# Patient Record
Sex: MALE | Race: WHITE
[De-identification: names, ages, dates, MRNs, and addresses within clinical notes are randomized per-mention and may not be internally consistent; named-entity substitution may affect disease eponyms.]

---

## 2019-03-21 ENCOUNTER — HOSPITAL ENCOUNTER (EMERGENCY)
Dept: HOSPITAL 10 - FTE | Age: 34
Discharge: HOME | End: 2019-03-21
Payer: COMMERCIAL

## 2019-03-21 VITALS — RESPIRATION RATE: 16 BRPM | DIASTOLIC BLOOD PRESSURE: 89 MMHG | SYSTOLIC BLOOD PRESSURE: 152 MMHG | HEART RATE: 110 BPM

## 2019-03-21 VITALS
HEIGHT: 67 IN | BODY MASS INDEX: 34.08 KG/M2 | HEIGHT: 67 IN | WEIGHT: 217.16 LBS | BODY MASS INDEX: 34.08 KG/M2 | WEIGHT: 217.16 LBS
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
## 2019-03-21 DIAGNOSIS — S62.307A: Primary | ICD-10-CM
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
## 2019-03-21 PROCEDURE — 29125 APPL SHORT ARM SPLINT STATIC: CPT

## 2019-03-21 PROCEDURE — 73130 X-RAY EXAM OF HAND: CPT

## 2019-03-21 NOTE — ERD
ER Documentation


Chief Complaint


Chief Complaint





Complains of left hand ppain after hitting a refrigerator





HPI


33-year-old male presenting with pain to left hand.  Patient states that he had 


a refrigerator and it caused him pain.  This happened yesterday.  He has not 


taken medications.  He is right-hand dominant.  Denies medical problems.  NKDA. 


Surgical history denies.  Social history denies





ROS


All systems reviewed and are negative except as per history of present illness.





Medications


Home Meds


Active Scripts


Naproxen* (Naprosyn*) 500 Mg Tablet, 500 MG PO BID PRN for PAIN AND/OR 


INFLAMMATION, #30 TAB


   Prov:LESA HAN PA-C         3/21/19


Hydrocodone/Acetaminophen (Norco 5-325 Tablet) 1 Each Tablet, 1 TAB PO Q6H PRN 


for PAIN, #7 TAB


   Prov:LESA HAN PA-C         3/21/19





Allergies


Allergies:  


Coded Allergies:  


     No Known Allergy (Unverified , 3/21/19)





PMhx/Soc


Medical and Surgical Hx:  pt denies Medical Hx, pt denies Surgical Hx


Hx Alcohol Use:  No


Hx Substance Use:  No


Hx Tobacco Use:  No


Smoking Status:  Never smoker





FmHx


Family History:  No diabetes, No coronary disease, No other





Physical Exam


Vitals





Vital Signs


  Date      Temp   Pulse  Resp  B/P (MAP)   Pulse Ox  O2         O2 Flow    FiO2


Time                                                  Delivery   Rate


   3/21/19  100.8    110    16      152/89        98  Room Air


     13:03                           (110)


   3/21/19   98.7    115    20      141/92        98


     09:50                           (108)





Physical Exam


GENERAL: The patient is well-appearing, well-nourished, in no acute distress


CHEST: Clear to auscultation bilaterally.  There are no rales, wheezes or 


rhonchi.


HEART: Regular rate and rhythm.  No murmurs, clicks, rubs or gallops.  


EXTREMITIES: Tender to palpation to the base of the right fifth digit.  Mild 


deformity with swelling.  Patient is able to isolate the DIP and PIP joint as 


well as the MCP joint of the fifth digit.  No neurovascular deficit.


NEUROLOGIC: Alert and oriented.  Cranial nerves II through XII intact.  Motor 


strength in all 4 extremities with 5 out of 5 strength.  Sensation grossly 


intact.  


SKIN: There is no apparent rash or petechiae.  The skin is warm and dry.


Results 24 hrs





Current Medications


 Medications
   Dose
          Sig/Akshat
       Start Time
   Status  Last


 (Trade)       Ordered        Route
 PRN     Stop Time              Admin
Dose


                              Reason                                Admin


                1 tab          ONCE  ONCE
    3/21/19       DC           3/21/19


Acetaminophen                 PO
            12:30
                       12:19



/
                                           3/21/19 12:31


Hydrocodone


Bitart



(Norco


(5/325))


 Ondansetron    4 mg           ONCE  STAT
    3/21/19       DC           3/21/19


HCl
  (Zofran                 ODT
           12:10
                       12:19



Odt)                                         3/21/19 12:11








Procedures/MDM





                            DIAGNOSTIC IMAGING REPORT





Patient: MORAIMA KRISHNAN   : 1985   Age: 33  Sex: M                  


     


       MR #:    [id number]   Acct #:   [id number]    DOS: 19 1119


Ordering MD: BK HAN PA-C   Location:  [facility]   Room/Bed:            


                               


                                        


PROCEDURE:   Left hand x-ray 


 


CLINICAL INDICATION:   pain 


 


TECHNIQUE:   AP, lateral and oblique views of the left hand were obtained. 


 


COMPARISON:   None 


 


FINDINGS:


There is normal mineralization.  


There is a possible nondisplaced fracture involving the proximal aspect of the 


fifth metacarpal. There is associated soft tissue swelling.


There are no significant degenerative changes. 


 


 


RPTAT: AA


 


IMPRESSION:


Probable nondisplaced fracture involving the proximal aspect of the fifth 


metacarpal with associated soft tissue swelling.





ER Course: Ulnar gutter splint applied in ED.  Patient is neuro intact pre-and 


post splint application.  Norco and Zofran given ED.





MDM: 33-year-old male presenting with fracture of the left hand.  I have low 


suspicion for neurovascular deficit.  I have low suspicion for tendon or 


ligament rupture.  Patient will be recommended to follow-up with orthopedist for


casting.  I have low suspicion for vascular injury.  Patient is discharged 


stricter precautions and told to follow-up with primary care within 1-2 days for


close evaluation.  All questions answered at discharge





Departure


Diagnosis:  


   Primary Impression:  


   Boxers fracture


Condition:  Stable


Patient Instructions:  Fracture, Boxer's


Referrals:  


NYDIA BARTLETT MD [facility]


Hours: Mon-Fri


9:00 AM - 5:00 PM





Additional Instructions:  


FOLLOW UP WITH YOUR PRIMARY CARE PHYSICIAN TOMORROW.Return to this facility if 


you are not improving as expected.











LESA HAN PA-C       Mar 21, 2019 13:10